# Patient Record
Sex: MALE | Race: WHITE | Employment: PART TIME | URBAN - METROPOLITAN AREA
[De-identification: names, ages, dates, MRNs, and addresses within clinical notes are randomized per-mention and may not be internally consistent; named-entity substitution may affect disease eponyms.]

---

## 2019-02-12 PROBLEM — F41.9 ANXIETY: Status: ACTIVE | Noted: 2019-02-12

## 2019-12-06 PROBLEM — Z87.440: Status: ACTIVE | Noted: 2019-11-01

## 2019-12-06 PROBLEM — F41.9 ANXIETY: Chronic | Status: ACTIVE | Noted: 2019-02-12

## 2021-05-04 PROBLEM — D69.6 THROMBOCYTOPENIA (HCC): Status: ACTIVE | Noted: 2021-05-04

## 2022-01-12 ENCOUNTER — E-VISIT (OUTPATIENT)
Dept: PRIMARY CARE CLINIC | Age: 32
End: 2022-01-12
Payer: COMMERCIAL

## 2022-01-12 DIAGNOSIS — R09.82 PND (POST-NASAL DRIP): Primary | ICD-10-CM

## 2022-01-12 PROCEDURE — 99422 OL DIG E/M SVC 11-20 MIN: CPT | Performed by: NURSE PRACTITIONER

## 2022-01-12 RX ORDER — FLUTICASONE PROPIONATE 50 MCG
2 SPRAY, SUSPENSION (ML) NASAL DAILY
Qty: 16 G | Refills: 0 | Status: SHIPPED | OUTPATIENT
Start: 2022-01-12 | End: 2022-02-21

## 2022-01-12 RX ORDER — LORATADINE AND PSEUDOEPHEDRINE SULFATE 5; 120 MG/1; MG/1
1 TABLET, EXTENDED RELEASE ORAL 2 TIMES DAILY
Qty: 10 TABLET | Refills: 0 | Status: SHIPPED | OUTPATIENT
Start: 2022-01-12 | End: 2022-01-17

## 2022-01-12 ASSESSMENT — LIFESTYLE VARIABLES
PACKS_PER_DAY: .3
SMOKING_YEARS: 5
SMOKING_STATUS: NO, I'M A FORMER SMOKER

## 2022-01-12 NOTE — PATIENT INSTRUCTIONS
Patient Education        Saline Nasal Washes: Care Instructions  Your Care Instructions   Patient Education        fluticasone nasal  Pronunciation:  floo TIK a sone  Brand:  Flonase, Veramyst, [place]  What is the most important information I should know about fluticasone nasal?  Follow all directions on your medicine label and package. Tell each of your healthcare providers about all your medical conditions, allergies, and all medicines you use. What is fluticasone nasal?  Fluticasone nasal (for the nose) is a steroid medicine that is used to treat nasal congestion, sneezing, runny nose, and itchy or watery eyes caused by seasonal or year-round allergies. The [place] Severo brand of this medicine is for use only in adults. Veramyst may be used in children as young as 3years old. Flonase is for use in adults and children who are at least 3years old. Fluticasone nasal may also be used for purposes not listed in this medication guide. What should I discuss with my healthcare provider before using fluticasone nasal?  You should not use fluticasone nasal if you are allergic to it. Fluticasone can weaken your immune system,  making it easier for you to get an infection or worsening an infection you already have or recently had. Tell your doctor about any illness or infection you have had within the past several weeks. Tell your doctor if you have ever had:  · sores or ulcers inside your nose;  · injury of or surgery on your nose;  · glaucoma or cataracts;  · liver disease;  · diabetes;  · a weak immune system; or  · any type of infection (bacterial, fungal, viral, or parasitic). If you use fluticasone nasal without a prescription and you have any medical conditions, ask a doctor or pharmacist if this medicine is safe for you. Tell your doctor if you are pregnant or breast-feeding. How should I use fluticasone nasal?  Follow all directions on your prescription label and read all medication guides or instruction sheets. Use the medicine exactly as directed. Do not share this medicine with another person, even if they have the same symptoms you have. Your dose will depend on the fluticasone brand or strength you use, and your dose may change once your symptoms improve. Follow all dosing instructions very carefully. A child using the nasal spray should be supervised by an adult. Read and carefully follow any Instructions for Use provided with your medicine. Ask your doctor or pharmacist if you do not understand these instructions. Shake the nasal spray just before each use. If you switched to fluticasone from another steroid medicine, you should not stop using it suddenly. Follow your doctor's instructions about tapering your dose. It may take several days before your symptoms improve. Keep using the medication as directed and tell your doctor if your symptoms do not improve after a week of treatment. Store fluticasone nasal in an upright position at room temperature, away from moisture and heat. Throw the spray bottle away after you have used 120 sprays, even if there is still medicine left in the bottle. What happens if I miss a dose? Use the medicine as soon as you can, but skip the missed dose if it is almost time for your next dose. Do not use two doses at one time. What happens if I overdose? Seek emergency medical attention or call the Poison Help line at 1-689.368.6603. An overdose of fluticasone nasal is not expected to produce life threatening symptoms. Long term use of steroid medicine can lead to glaucoma, cataracts, thinning skin, easy bruising, changes in body fat (especially in your face, neck, back, and waist), increased acne or facial hair, menstrual problems, impotence, or loss of interest in sex. What should I avoid while using fluticasone nasal?  Avoid getting the spray in your eyes or mouth. If this does happen, rinse with water. Avoid being near people who are sick or have infections.  Call your doctor for preventive treatment if you are exposed to chickenpox or measles. These conditions can be serious or even fatal in people who are using fluticasone nasal.  What are the possible side effects of fluticasone nasal?  Get emergency medical help if you have signs of an allergic reaction: hives, rash; feeling light-headed; difficult breathing; swelling of your face, lips, tongue, or throat. Call your doctor at once if you have:  · severe or ongoing nosebleeds;  · noisy breathing, runny nose, or crusting around your nostrils;  · redness, sores, or white patches in your mouth or throat;  · fever, chills, body aches;  · blurred vision, eye pain, or seeing halos around lights;  · any wound that will not heal; or  · signs of a hormonal disorder --worsening tiredness or muscle weakness, feeling light-headed, nausea, vomiting. Steroid medicine can affect growth in children. Tell your doctor if your child is not growing at a normal rate while using this medicine. Common side effects may include:  · minor nosebleed, burning or itching in your nose;  · sores or white patches inside or around your nose;  · cough, trouble breathing;  · headache, back pain;  · sinus pain, sore throat, fever; or  · nausea, vomiting. This is not a complete list of side effects and others may occur. Call your doctor for medical advice about side effects. You may report side effects to FDA at 6-102-FDA-4406. What other drugs will affect fluticasone nasal?  Tell your doctor about all your other medicines, especially:  · antifungal medicine; or  · antiviral medicine to treat hepatis C or HIV/AIDS. This list is not complete. Other drugs may affect fluticasone nasal, including prescription and over-the-counter medicines, vitamins, and herbal products. Not all possible drug interactions are listed here. Where can I get more information?   Your pharmacist can provide more information about fluticasone nasal.  Remember, keep this and all other medicines out of the reach of children, never share your medicines with others, and use this medication only for the indication prescribed. Every effort has been made to ensure that the information provided by [address] is accurate, up-to-date, and complete, but no guarantee is made to that effect. Drug information contained herein may be time sensitive. [facility] information has been compiled for use by healthcare practitioners and consumers in the [facility] and therefore [facility] does not warrant that uses outside of the [facility] are appropriate, unless specifically indicated otherwise. [facility]'s drug information does not endorse drugs, diagnose patients or recommend therapy. [facility]'s drug information is an informational resource designed to assist licensed healthcare practitioners in caring for their patients and/or to serve consumers viewing this service as a supplement to, and not a substitute for, the expertise, skill, knowledge and judgment of healthcare practitioners. The absence of a warning for a given drug or drug combination in no way should be construed to indicate that the drug or drug combination is safe, effective or appropriate for any given patient. [facility] does not assume any responsibility for any aspect of healthcare administered with the aid of information [facility] provides. The information contained herein is not intended to cover all possible uses, directions, precautions, warnings, drug interactions, allergic reactions, or adverse effects. If you have questions about the drugs you are taking, check with your doctor, nurse or pharmacist.  Copyright 6747-1776 [address]: 10.02. Revision date: 12/30/2019. Care instructions adapted under license by [facility] ([facility]).  If you have questions about a medical condition or this instruction, always ask your healthcare professional. [place] [address] any warranty or liability for your use of this information. Saline nasal washes help keep the nasal passages open by washing out thick or dried mucus. This simple remedy can help relieve symptoms of allergies, sinusitis, and colds. It also can make the nose feel more comfortable by keeping the mucous membranes moist. You may notice a little burning sensation in your nose the first few times you use the solution, but this usually gets better in a few days. Follow-up care is a key part of your treatment and safety. Be sure to make and go to all appointments, and call your doctor if you are having problems. It's also a good idea to know your test results and keep a list of the medicines you take. How can you care for yourself at home? · You can buy premixed saline solution in a squeeze bottle or other sinus rinse products at a drugstore. Read and follow the instructions on the label. · You also can make your own saline solution by adding 1 teaspoon of salt and 1 teaspoon of baking soda to 2 cups of distilled water. · If you use a homemade solution, pour a small amount into a clean bowl. Using a rubber bulb syringe, squeeze the syringe and place the tip in the salt water. Pull a small amount of the salt water into the syringe by relaxing your hand. · Sit down with your head tilted slightly back. Do not lie down. Put the tip of the bulb syringe or the squeeze bottle a little way into one of your nostrils. Gently drip or squirt a few drops into the nostril. Repeat with the other nostril. Some sneezing and gagging are normal at first.  · Gently blow your nose. · Wipe the syringe or bottle tip clean after each use. · Repeat this 2 or 3 times a day. · Use nasal washes gently if you have nosebleeds often. When should you call for help? Watch closely for changes in your health, and be sure to contact your doctor if:    · You often get nosebleeds.     · You have problems doing the nasal washes. Where can you learn more?   Go to https://chpepiceweb.[email or web address] org and sign in to your [employer] account. Enter [phone] in the [facility] box to learn more about \"Saline Nasal Washes: Care Instructions. \"     If you do not have an account, please click on the \"Sign Up Now\" link. Current as of: September 8, 2021               Content Version: 13.1  © 2006-2021 [employer]. Care instructions adapted under license by [facility] ([facility]). If you have questions about a medical condition or this instruction, always ask your healthcare professional. [place] [address] any warranty or liability for your use of this information. Patient Education        Learning About a Lump in the Throat  What is a lump in the throat? The condition that people refer to as a \"lump in the throat\" is a feeling that an object is stuck in your throat. It's also called globus (say \"GLOH-bus\"). You don't really have anything stuck there. But the feeling is real, and it can be uncomfortable. This feeling may be there all the time, or it may happen now and then. It's not painful. The lump is felt in the front of the throat. It may feel like it moves up and down when you swallow. It's not clear where this feeling in the throat comes from. Gastroesophageal reflux disease (GERD) may contribute to it. Reflux means that stomach acid and juices flow from the stomach back up into the tube that leads from the throat to the stomach. (This tube is called the esophagus.) The lump in the throat may have other causes. These include problems with swallowing, as well as muscle spasms in the esophagus. It may also be felt as a result of stress or an anxiety disorder. In some cases, the feeling goes away by itself over time. How is it treated? The doctor will first examine you to try to find the cause of the feeling of the lump in your throat. He or she may give you medicine to reduce stomach acid to see if it helps relieve the lump sensation. If anxiety is a factor, the doctor may work with you to address it. If you still feel like there's a lump in your throat, your doctor may give you a variety of other tests. He or she may examine your throat, neck, voice box (pharynx), and esophagus to see if you have any blockage. Your doctor may use a thin, flexible tube, called a scope, to look deep into your throat. This is called a laryngoscopy. Or you may have a swallowing study that uses X-rays to film your throat as you swallow. If the tests find a problem that can be treated, your doctor will treat you for it. You may have speech therapy to learn how to relax the muscles of your throat or swallow differently. And just finding out that there's no physical cause for the lump in your throat may help you feel better. How can you care for yourself at home? · Try not to clear your throat or swallow too often. · Have something to eat or drink. This may give you some relief for a while. · Try to relax and reduce stress. Relaxation techniques such as deep breathing or meditation may help. Follow-up care is a key part of your treatment and safety. Be sure to make and go to all appointments, and call your doctor if you are having problems. It's also a good idea to know your test results and keep a list of the medicines you take. Where can you learn more? Go to https://[employer]iver[employer]ow Software.[employer]. org and sign in to your [employer] account. Enter D782 in the [facility] box to learn more about \"Learning About a Lump in the Throat. \"     If you do not have an account, please click on the \"Sign Up Now\" link. Current as of: September 8, 2021               Content Version: 13.1  © 9345-1357 Healthwise, [employer]. Care instructions adapted under license by [facility] ([facility]).  If you have questions about a medical condition or this instruction, always ask your healthcare professional. Estella Lundberg any warranty or liability for your use of this information.

## 2022-01-12 NOTE — PROGRESS NOTES
Medications, allergies, PMH and questionnaire reviewed. Dx: Post nasal drip    Tx: Claritin-d and flonase daily. Avoidance of triggers, no acute symptoms of illness. Follow up with PCP as needed. Time spent: 11-20 mins     Called and discussed with pt in response to e-visit reply, has intermittent GERD symptoms and globus sensation as well as bad breath, will trial otc omeprazole daily and antireflux lifestyle. 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 Recommend in person eval if symptoms persist or worsen.      Electronically signed by LLOYD Causey CNP on 1/12/2022 at 1:58 PM

## 2025-01-11 ENCOUNTER — OFFICE VISIT (OUTPATIENT)
Dept: URGENT CARE | Age: 35
End: 2025-01-11

## 2025-01-11 VITALS
RESPIRATION RATE: 18 BRPM | HEIGHT: 68 IN | HEART RATE: 62 BPM | OXYGEN SATURATION: 96 % | TEMPERATURE: 97.1 F | SYSTOLIC BLOOD PRESSURE: 122 MMHG | BODY MASS INDEX: 28.79 KG/M2 | DIASTOLIC BLOOD PRESSURE: 83 MMHG | WEIGHT: 190 LBS

## 2025-01-11 DIAGNOSIS — K04.01 PULPITIS: Primary | ICD-10-CM

## 2025-01-11 PROCEDURE — 99203 OFFICE O/P NEW LOW 30 MIN: CPT | Performed by: PHYSICIAN ASSISTANT

## 2025-01-11 PROCEDURE — 3008F BODY MASS INDEX DOCD: CPT | Performed by: PHYSICIAN ASSISTANT

## 2025-01-11 PROCEDURE — 1036F TOBACCO NON-USER: CPT | Performed by: PHYSICIAN ASSISTANT

## 2025-01-11 RX ORDER — AMOXICILLIN AND CLAVULANATE POTASSIUM 875; 125 MG/1; MG/1
1 TABLET, FILM COATED ORAL EVERY 12 HOURS
Qty: 20 TABLET | Refills: 0 | Status: SHIPPED | OUTPATIENT
Start: 2025-01-11 | End: 2025-01-21

## 2025-01-11 ASSESSMENT — PAIN SCALES - GENERAL: PAINLEVEL_OUTOF10: 2

## 2025-01-11 NOTE — PATIENT INSTRUCTIONS
Schedule an appointment with your dentist for an examination on 1/22/25 or later.    If any new/worsening symptoms, proceed to the emergency department for additional workup.

## 2025-01-11 NOTE — PROGRESS NOTES
"Subjective   Patient ID: Kayode Dykes is a 34 y.o. male. They present today with a chief complaint of Sinus Problem (Pain in gum under left eye x 1 day).    Patient disposition: Home    HISTORY OF PRESENT ILLNESS:    This is an [facility] adult presenting for a suspected sinus infection. He has focal dull pain at the L upper molar area for 2d, worsening. Denies aggravating factors. Denies paresthesias. Denies congestion, facial pain elsewhere, ST, cough, f/c/s. Denies known dental issues.        Past Medical History  Allergies as of 01/11/2025    (No Known Allergies)       (Not in a hospital admission)       No past medical history on file.    No past surgical history on file.     reports that he has never smoked. He has never used smokeless tobacco. He reports that he does not use drugs.    Review of Systems    Negative except as documented in the History of Present Illness.                             Objective    Vitals:    01/11/25 1033   BP: 122/83   BP Location: Right arm   Patient Position: Sitting   BP Cuff Size: Adult   Pulse: 62   Resp: 18   Temp: 36.2 °C (97.1 °F)   TempSrc: Skin   SpO2: 96%   Weight: 86.2 kg (190 lb)   Height: 1.727 m (5' 8\")     No LMP for male patient.      PHYSICAL EXAMINATION:    CONSTITUTIONAL: well-appearing, nontoxic         ENT:  Head and face are unremarkable and atraumatic. Mucous membranes moist.    No sinus TTP    * Oropharynx nl. Airway patent.  No periodontal abscess.  No facial asymmetry.  No dental TTP.    * No uvular deviation. No visible abscess.    * Lymphadenopathy absent.    * TMs nl bl.         LUNGS:  CTAB, no r/r/w.    CARDIOVASCULAR:   RRR, no m/r/g. Nl S1/S2.    ABDOMEN:  Nontender including left upper quadrant, nondistended, no acute abdomen.     MUSCULOSKELETAL: No obvious deformities. CANO with equal strength. Gait normal.    SKIN:   Warm and dry with no rashes.    NEURO:  Normal baseline mental status.    PSYCH: Appropriate mood and affect.     "     ------------------------------------------         MDM: The pt localizes the site of pain at the root of tooth #14-15. His site of pain is not near the maxillary sinus. I discussed this with him and I do not suspect sinusitis as the cause. A dental infxn (pulpitis, abscess) is far more likely clinically at this site. The pt has numerous fillings on these molars and may have an occult cavity as the point of bacterial entry. He was Rxd Augmentin and will fu at dentist. He was instructed to go to ED for additional workup if any worsening of his condition.        Procedures    Diagnostic study results (if any) were reviewed by Landon Bundy PA-C.    No results found for this visit on 01/11/25.     Assessment/Plan   Allergies, medications, history, and pertinent labs/EKGs/Imaging reviewed by Landon Bundy PA-C.     Orders and Diagnoses  There are no diagnoses linked to this encounter.    Medical Admin Record      Follow Up Instructions  No follow-ups on file.    Electronically signed by Landon Bundy PA-C  10:42 AM